# Patient Record
Sex: FEMALE | Race: WHITE | NOT HISPANIC OR LATINO | Employment: FULL TIME | URBAN - METROPOLITAN AREA
[De-identification: names, ages, dates, MRNs, and addresses within clinical notes are randomized per-mention and may not be internally consistent; named-entity substitution may affect disease eponyms.]

---

## 2017-08-12 ENCOUNTER — HOSPITAL ENCOUNTER (EMERGENCY)
Facility: HOSPITAL | Age: 22
Discharge: HOME | End: 2017-08-13
Attending: EMERGENCY MEDICINE | Admitting: EMERGENCY MEDICINE

## 2017-08-12 DIAGNOSIS — R10.11 RIGHT UPPER QUADRANT ABDOMINAL PAIN: Primary | ICD-10-CM

## 2017-08-12 LAB
BASOPHILS # BLD AUTO: 0.02 10*3/MM3 (ref 0–0.2)
BASOPHILS NFR BLD AUTO: 0.2 % (ref 0–1.5)
BILIRUB UR QL STRIP: NEGATIVE
CLARITY UR: CLEAR
COLOR UR: YELLOW
DEPRECATED RDW RBC AUTO: 40.4 FL (ref 37–54)
EOSINOPHIL # BLD AUTO: 0.15 10*3/MM3 (ref 0–0.7)
EOSINOPHIL NFR BLD AUTO: 1.7 % (ref 0.3–6.2)
ERYTHROCYTE [DISTWIDTH] IN BLOOD BY AUTOMATED COUNT: 12.5 % (ref 11.7–13)
GLUCOSE UR STRIP-MCNC: NEGATIVE MG/DL
HCG SERPL QL: NEGATIVE
HCT VFR BLD AUTO: 45 % (ref 35.6–45.5)
HGB BLD-MCNC: 15.8 G/DL (ref 11.9–15.5)
HGB UR QL STRIP.AUTO: NEGATIVE
IMM GRANULOCYTES # BLD: 0 10*3/MM3 (ref 0–0.03)
IMM GRANULOCYTES NFR BLD: 0 % (ref 0–0.5)
KETONES UR QL STRIP: NEGATIVE
LEUKOCYTE ESTERASE UR QL STRIP.AUTO: NEGATIVE
LYMPHOCYTES # BLD AUTO: 3.44 10*3/MM3 (ref 0.9–4.8)
LYMPHOCYTES NFR BLD AUTO: 39.6 % (ref 19.6–45.3)
MCH RBC QN AUTO: 31.5 PG (ref 26.9–32)
MCHC RBC AUTO-ENTMCNC: 35.1 G/DL (ref 32.4–36.3)
MCV RBC AUTO: 89.8 FL (ref 80.5–98.2)
MONOCYTES # BLD AUTO: 0.46 10*3/MM3 (ref 0.2–1.2)
MONOCYTES NFR BLD AUTO: 5.3 % (ref 5–12)
NEUTROPHILS # BLD AUTO: 4.62 10*3/MM3 (ref 1.9–8.1)
NEUTROPHILS NFR BLD AUTO: 53.2 % (ref 42.7–76)
NITRITE UR QL STRIP: NEGATIVE
PH UR STRIP.AUTO: 7.5 [PH] (ref 5–8)
PLATELET # BLD AUTO: 321 10*3/MM3 (ref 140–500)
PMV BLD AUTO: 9.8 FL (ref 6–12)
PROT UR QL STRIP: NEGATIVE
RBC # BLD AUTO: 5.01 10*6/MM3 (ref 3.9–5.2)
SP GR UR STRIP: <1.005 (ref 1–1.03)
UROBILINOGEN UR QL STRIP: ABNORMAL
WBC NRBC COR # BLD: 8.69 10*3/MM3 (ref 4.5–10.7)

## 2017-08-12 PROCEDURE — 99283 EMERGENCY DEPT VISIT LOW MDM: CPT

## 2017-08-12 PROCEDURE — 83690 ASSAY OF LIPASE: CPT | Performed by: EMERGENCY MEDICINE

## 2017-08-12 PROCEDURE — 81003 URINALYSIS AUTO W/O SCOPE: CPT

## 2017-08-12 PROCEDURE — 36415 COLL VENOUS BLD VENIPUNCTURE: CPT

## 2017-08-12 PROCEDURE — 85025 COMPLETE CBC W/AUTO DIFF WBC: CPT

## 2017-08-12 PROCEDURE — 84703 CHORIONIC GONADOTROPIN ASSAY: CPT | Performed by: EMERGENCY MEDICINE

## 2017-08-12 PROCEDURE — 80053 COMPREHEN METABOLIC PANEL: CPT | Performed by: EMERGENCY MEDICINE

## 2017-08-12 RX ORDER — SODIUM CHLORIDE 0.9 % (FLUSH) 0.9 %
10 SYRINGE (ML) INJECTION AS NEEDED
Status: DISCONTINUED | OUTPATIENT
Start: 2017-08-12 | End: 2017-08-13 | Stop reason: HOSPADM

## 2017-08-12 RX ORDER — OMEPRAZOLE 20 MG/1
20 CAPSULE, DELAYED RELEASE ORAL DAILY
COMMUNITY

## 2017-08-13 ENCOUNTER — APPOINTMENT (OUTPATIENT)
Dept: ULTRASOUND IMAGING | Facility: HOSPITAL | Age: 22
End: 2017-08-13

## 2017-08-13 VITALS
WEIGHT: 200 LBS | BODY MASS INDEX: 34.15 KG/M2 | HEART RATE: 80 BPM | HEIGHT: 64 IN | OXYGEN SATURATION: 100 % | SYSTOLIC BLOOD PRESSURE: 128 MMHG | DIASTOLIC BLOOD PRESSURE: 71 MMHG | RESPIRATION RATE: 16 BRPM | TEMPERATURE: 98.4 F

## 2017-08-13 LAB
ALBUMIN SERPL-MCNC: 5.4 G/DL (ref 3.5–5.2)
ALBUMIN/GLOB SERPL: 1.9 G/DL
ALP SERPL-CCNC: 81 U/L (ref 39–117)
ALT SERPL W P-5'-P-CCNC: 32 U/L (ref 1–33)
ANION GAP SERPL CALCULATED.3IONS-SCNC: 15.1 MMOL/L
AST SERPL-CCNC: 24 U/L (ref 1–32)
BILIRUB SERPL-MCNC: 0.3 MG/DL (ref 0.1–1.2)
BUN BLD-MCNC: 11 MG/DL (ref 6–20)
BUN/CREAT SERPL: 12.8 (ref 7–25)
CALCIUM SPEC-SCNC: 10.4 MG/DL (ref 8.6–10.5)
CHLORIDE SERPL-SCNC: 102 MMOL/L (ref 98–107)
CO2 SERPL-SCNC: 24.9 MMOL/L (ref 22–29)
CREAT BLD-MCNC: 0.86 MG/DL (ref 0.57–1)
GFR SERPL CREATININE-BSD FRML MDRD: 82 ML/MIN/1.73
GLOBULIN UR ELPH-MCNC: 2.8 GM/DL
GLUCOSE BLD-MCNC: 99 MG/DL (ref 65–99)
HOLD SPECIMEN: NORMAL
HOLD SPECIMEN: NORMAL
LIPASE SERPL-CCNC: 32 U/L (ref 13–60)
POTASSIUM BLD-SCNC: 4.1 MMOL/L (ref 3.5–5.2)
PROT SERPL-MCNC: 8.2 G/DL (ref 6–8.5)
SODIUM BLD-SCNC: 142 MMOL/L (ref 136–145)
WHOLE BLOOD HOLD SPECIMEN: NORMAL
WHOLE BLOOD HOLD SPECIMEN: NORMAL

## 2017-08-13 PROCEDURE — 76705 ECHO EXAM OF ABDOMEN: CPT

## 2017-08-13 PROCEDURE — 96374 THER/PROPH/DIAG INJ IV PUSH: CPT

## 2017-08-13 PROCEDURE — 25010000002 PROMETHAZINE PER 50 MG: Performed by: EMERGENCY MEDICINE

## 2017-08-13 PROCEDURE — 96361 HYDRATE IV INFUSION ADD-ON: CPT

## 2017-08-13 RX ORDER — PROMETHAZINE HYDROCHLORIDE 25 MG/ML
12.5 INJECTION, SOLUTION INTRAMUSCULAR; INTRAVENOUS ONCE
Status: COMPLETED | OUTPATIENT
Start: 2017-08-13 | End: 2017-08-13

## 2017-08-13 RX ORDER — PROMETHAZINE HYDROCHLORIDE 25 MG/1
25 TABLET ORAL EVERY 6 HOURS PRN
Qty: 20 TABLET | Refills: 0 | Status: SHIPPED | OUTPATIENT
Start: 2017-08-13

## 2017-08-13 RX ORDER — DICYCLOMINE HCL 20 MG
20 TABLET ORAL EVERY 6 HOURS
Qty: 40 TABLET | Refills: 0 | Status: SHIPPED | OUTPATIENT
Start: 2017-08-13 | End: 2017-11-07 | Stop reason: SDUPTHER

## 2017-08-13 RX ADMIN — SODIUM CHLORIDE 1000 ML: 9 INJECTION, SOLUTION INTRAVENOUS at 00:57

## 2017-08-13 RX ADMIN — PROMETHAZINE HYDROCHLORIDE 12.5 MG: 25 INJECTION INTRAMUSCULAR; INTRAVENOUS at 00:59

## 2017-08-13 NOTE — ED NOTES
abd pain with n/v and fever since 1300. pt states she is unable to keep down fluids.      Maya Pa RN  08/12/17 2317

## 2017-08-13 NOTE — ED PROVIDER NOTES
EMERGENCY DEPARTMENT ENCOUNTER    CHIEF COMPLAINT  Chief Complaint: RUQ abdominal pain  History given by: Pt  History limited by: N/A  Room Number: 22/22  PMD: No Known Provider      HPI:  Pt is a 22 y.o. female who presents complaining of RUQ abdominal waxing and waning pain that radiates to the back with an onset of 3 months ago. Pain is described as a dull ache and is rated 5/10. Pt's pain is exuberated by eating. Pt is also c/o of fever with Tmax of 99.8, N//V [multiple episodes] for the past 24 hours, and bowel changes that have been ongoing for the past 3 months as well. These bowel changes range from constipation to diarrhea. Pt denies any hx of similar episodes or a hx of cholelithiasis.     Duration: 3 months   Onset: gradual   Timing: waxing and waning   Location: RUQ abdomen   Radiation: to the right side of the back  Quality: dull ache   Intensity/Severity: 5/10  Progression: unchanged   Associated Symptoms: pain radiation into the back, fever, N/V, bowel changes  Aggravating Factors: Eating   Alleviating Factors: None reported   Previous Episodes: Pt has no hx of similar episodes.   Treatment before arrival: None reported.     PAST MEDICAL HISTORY  Active Ambulatory Problems     Diagnosis Date Noted   • Right shoulder pain 09/13/2016     Resolved Ambulatory Problems     Diagnosis Date Noted   • No Resolved Ambulatory Problems     Past Medical History:   Diagnosis Date   • Ankle sprain    • Fracture of ankle    • Shoulder dislocation 2011       PAST SURGICAL HISTORY  Past Surgical History:   Procedure Laterality Date   • ANKLE OPEN REDUCTION INTERNAL FIXATION Right 2010    Reconstruction surgeries 2010 & 2011       FAMILY HISTORY  Family History   Problem Relation Age of Onset   • Heart disease Maternal Grandfather    • Hypertension Maternal Grandfather    • Arthritis Paternal Grandfather        SOCIAL HISTORY  Social History     Social History   • Marital status: Single     Spouse name: N/A   • Number  of children: N/A   • Years of education: N/A     Occupational History   • Not on file.     Social History Main Topics   • Smoking status: Never Smoker   • Smokeless tobacco: Never Used   • Alcohol use Yes      Comment: Occasional   • Drug use: No   • Sexual activity: Yes     Partners: Male     Birth control/ protection: Condom     Other Topics Concern   • Not on file     Social History Narrative       ALLERGIES  Review of patient's allergies indicates no known allergies.    REVIEW OF SYSTEMS  Review of Systems   Constitutional: Positive for fever (Tmax 99.8).   HENT: Negative for sore throat.    Eyes: Negative.    Respiratory: Negative for cough and shortness of breath.    Cardiovascular: Negative for chest pain.   Gastrointestinal: Positive for abdominal pain (RUQ pain ), constipation, diarrhea, nausea and vomiting (multiple episodes for past 24 hours ).   Genitourinary: Negative for dysuria.   Musculoskeletal: Positive for back pain. Negative for neck pain.   Skin: Negative for rash.   Allergic/Immunologic: Negative.    Neurological: Negative for weakness, numbness and headaches.   Hematological: Negative.    Psychiatric/Behavioral: Negative.    All other systems reviewed and are negative.      PHYSICAL EXAM  ED Triage Vitals   Temp Heart Rate Resp BP SpO2   08/12/17 2310 08/12/17 2310 08/12/17 2310 08/12/17 2310 08/12/17 2310   98.4 °F (36.9 °C) 116 18 156/113 98 %      Temp src Heart Rate Source Patient Position BP Location FiO2 (%)   08/12/17 2310 08/12/17 2310 08/12/17 2310 08/12/17 2310 --   Oral Monitor Standing Right arm        Physical Exam   Constitutional: She is oriented to person, place, and time and well-developed, well-nourished, and in no distress. No distress.   HENT:   Head: Normocephalic and atraumatic.   Eyes: EOM are normal. Pupils are equal, round, and reactive to light.   Neck: Normal range of motion. Neck supple.   Cardiovascular: Normal rate, regular rhythm and normal heart sounds.     Pulmonary/Chest: Effort normal and breath sounds normal. No respiratory distress.   Abdominal: Soft. She exhibits no mass. There is tenderness (RUQ). There is no rebound and no guarding.   RUQ intermittent pain triggered by eating and some radiation to back intermittently  RUQ tenderness    Musculoskeletal: Normal range of motion. She exhibits no edema.   Neurological: She is alert and oriented to person, place, and time. She has normal sensation and normal strength.   Skin: Skin is warm and dry. No rash noted.   Psychiatric: Mood and affect normal.   Nursing note and vitals reviewed.      LAB RESULTS  Lab Results (last 24 hours)     Procedure Component Value Units Date/Time    CBC & Differential [[id number]] Collected:  08/12/17 2326    Specimen:  Blood Updated:  08/12/17 2342    Narrative:       The following orders were created for panel order CBC & Differential.  Procedure                               Abnormality         Status                     ---------                               -----------         ------                     CBC Auto Differential[[id number]]        Abnormal            Final result                 Please view results for these tests on the individual orders.    Comprehensive Metabolic Panel [[id number]]  (Abnormal) Collected:  08/12/17 2326    Specimen:  Blood Updated:  08/13/17 0002     Glucose 99 mg/dL      BUN 11 mg/dL      Creatinine 0.86 mg/dL      Sodium 142 mmol/L      Potassium 4.1 mmol/L      Chloride 102 mmol/L      CO2 24.9 mmol/L      Calcium 10.4 mg/dL      Total Protein 8.2 g/dL      Albumin 5.40 (H) g/dL      ALT (SGPT) 32 U/L      AST (SGOT) 24 U/L      Alkaline Phosphatase 81 U/L      Total Bilirubin 0.3 mg/dL      eGFR Non African Amer 82 mL/min/1.73      Globulin 2.8 gm/dL      A/G Ratio 1.9 g/dL      BUN/Creatinine Ratio 12.8     Anion Gap 15.1 mmol/L     Lipase [[id number]]  (Normal) Collected:  08/12/17 2326    Specimen:  Blood Updated:  08/13/17 0002     Lipase 32  U/L     hCG, Serum, Qualitative [[id number]]  (Normal) Collected:  08/12/17 2326    Specimen:  Blood Updated:  08/12/17 2358     HCG Qualitative Negative    CBC Auto Differential [[id number]]  (Abnormal) Collected:  08/12/17 2326    Specimen:  Blood Updated:  08/12/17 2342     WBC 8.69 10*3/mm3      RBC 5.01 10*6/mm3      Hemoglobin 15.8 (H) g/dL      Hematocrit 45.0 %      MCV 89.8 fL      MCH 31.5 pg      MCHC 35.1 g/dL      RDW 12.5 %      RDW-SD 40.4 fl      MPV 9.8 fL      Platelets 321 10*3/mm3      Neutrophil % 53.2 %      Lymphocyte % 39.6 %      Monocyte % 5.3 %      Eosinophil % 1.7 %      Basophil % 0.2 %      Immature Grans % 0.0 %      Neutrophils, Absolute 4.62 10*3/mm3      Lymphocytes, Absolute 3.44 10*3/mm3      Monocytes, Absolute 0.46 10*3/mm3      Eosinophils, Absolute 0.15 10*3/mm3      Basophils, Absolute 0.02 10*3/mm3      Immature Grans, Absolute 0.00 10*3/mm3     Urinalysis With / Culture If Indicated [[id number]]  (Abnormal) Collected:  08/12/17 2331    Specimen:  Urine from Urine, Clean Catch Updated:  08/12/17 2344     Color, UA Yellow     Appearance, UA Clear     pH, UA 7.5     Specific Gravity, UA <1.005 (L)     Glucose, UA Negative     Ketones, UA Negative     Bilirubin, UA Negative     Blood, UA Negative     Protein, UA Negative     Leuk Esterase, UA Negative     Nitrite, UA Negative     Urobilinogen, UA 0.2 E.U./dL    Narrative:       Urine microscopic not indicated.          I ordered the above labs and reviewed the results    RADIOLOGY  US Gallbladder   Final Result   1. Mild diffuse fatty infiltration of the liver.   2. Unremarkable gallbladder.           This report was finalized on 8/13/2017 1:03 AM by Zuhair Hernadez MD.               I ordered the above noted radiological studies. Interpreted by radiologist. Reviewed by me in PACS.       PROCEDURES  Procedures      PROGRESS AND CONSULTS  ED Course     2313: Ordered labs for further evaluation and analysis.     0029: Ordered US  Gallbladder for further evaluation and analysis.     0118: Rechecked pt and discussed negative US Gallbladders. Gave pt a GI specialist to f/u with according to pt's discretion. Prescribed Phenergan for nausea and Bentyl to be taken 30 minutes prior to eating. Discussed plans to d/c pt. Pt understands and agrees with plan. All Questions addressed at time.   MEDICAL DECISION MAKING  Results were reviewed/discussed with the patient and they were also made aware of online access. Pt also made aware that some labs, such as cultures, will not be resulted during ER visit and follow up with PMD is necessary.     MDM  Number of Diagnoses or Management Options     Amount and/or Complexity of Data Reviewed  Clinical lab tests: ordered and reviewed (Lipase 32)  Tests in the radiology section of CPT®: ordered and reviewed (US Gallbladder: Negative )           DIAGNOSIS  Final diagnoses:   Right upper quadrant abdominal pain       DISPOSITION  DISCHARGE    Patient discharged in stable condition.    Reviewed implications of results, diagnosis, meds, responsibility to follow up, warning signs and symptoms of possible worsening, potential complications and reasons to return to ER.    Patient/Family voiced understanding of above instructions.    Discussed plan for discharge, as there is no emergent indication for admission.  Pt/family is agreeable and understands need for follow up and repeat testing.  Pt is aware that discharge does not mean that nothing is wrong but it indicates no emergency is present that requires admission and they must continue care with follow-up as given below or physician of their choice.     FOLLOW-UP  [facility]  [facility] [address] [phone]  Call      Jesús Swartz MD  [address] [address] [phone]    Call           Medication List      New Prescriptions          dicyclomine 20 MG tablet   Commonly known as:  BENTYL    Take 1 tablet by mouth Every 6 (Six) Hours.       promethazine 25 MG tablet   Commonly known as:  PHENERGAN   Take 1 tablet by mouth Every 6 (Six) Hours As Needed for Nausea or   Vomiting.               Latest Documented Vital Signs:  As of 1:10 AM  BP- (!) 156/113 HR- 116 Temp- 98.4 °F (36.9 °C) (Oral) O2 sat- 100%    --  Documentation assistance provided by saida Contreras for Dr. Frances.  Information recorded by the scribe was done at my direction and has been verified and validated by me.     Piero Contreras  08/13/17 0120       Jose Frances MD  08/13/17 0601

## 2017-11-06 ENCOUNTER — TELEPHONE (OUTPATIENT)
Dept: GASTROENTEROLOGY | Facility: CLINIC | Age: 22
End: 2017-11-06

## 2017-11-06 NOTE — TELEPHONE ENCOUNTER
Call to mother, Hanna (see HIPAA auth of 9/13/16).  Reports that pt seen in ER 8/12/17 (see encounter note).  Since then, has continued to experience abd cramping, diarrhea.  Has lost 25 lbs in past 3 mo's.  Friday and Saturday had 4-7 liquid brown diarrhea stools/day.  No blood or mucous.  No fever.      Pt works at [employer] - sent home last night because of yellow, watery, foul smelling diarrhea.  Reports that crying in pain - localized to R side - above navel, and L side - below navel.  Has had negative US, HIDA, H pylori testing.  Mother concerned how to manage symptoms until appt on 11/17.  Advise if symptoms worsen, go to ER.  Hanna verb understanding.    Confer with Manager.  Rescheduled for 11/7 @ 4313.    Update to DR Pacheco.

## 2017-11-06 NOTE — TELEPHONE ENCOUNTER
----- Message from Joaquin Kirk sent at 11/6/2017  8:00 AM EST -----  Regarding: PT MOTHER ROWAN CALLED  Contact: [phone]   PT IS CALLING. SHE IS GOING TO BE A NEW PT & HER APPT IS NOV 17TH &  PT MOTHER IS CALLING STATING SHE IS HAVING CHANGES IN BOWELS, 4-7 DIARRHEA STOOLS OVER THE WEEKEND, HAD A ER VISIT ABOUT 2 MONTHS AGO. ALL TEST CAME BACK NEG, PT LOST 25 POUNDS IN THE LAST 3 MONTHS. MOTHER IS ASKING IS THEIR SOMETHING SHE CAN DO UP UNTIL HER APPT.   WORK:[phone]

## 2017-11-07 ENCOUNTER — OFFICE VISIT (OUTPATIENT)
Dept: GASTROENTEROLOGY | Facility: CLINIC | Age: 22
End: 2017-11-07

## 2017-11-07 VITALS
DIASTOLIC BLOOD PRESSURE: 86 MMHG | BODY MASS INDEX: 34.83 KG/M2 | WEIGHT: 204 LBS | SYSTOLIC BLOOD PRESSURE: 128 MMHG | HEIGHT: 64 IN | TEMPERATURE: 98.2 F

## 2017-11-07 DIAGNOSIS — R10.11 RIGHT UPPER QUADRANT ABDOMINAL PAIN: Primary | ICD-10-CM

## 2017-11-07 DIAGNOSIS — R11.14 BILIOUS VOMITING WITH NAUSEA: ICD-10-CM

## 2017-11-07 DIAGNOSIS — K59.1 FUNCTIONAL DIARRHEA: ICD-10-CM

## 2017-11-07 PROCEDURE — 99204 OFFICE O/P NEW MOD 45 MIN: CPT | Performed by: INTERNAL MEDICINE

## 2017-11-07 RX ORDER — DICYCLOMINE HCL 20 MG
20 TABLET ORAL EVERY 6 HOURS PRN
Qty: 90 TABLET | Refills: 2 | Status: SHIPPED | OUTPATIENT
Start: 2017-11-07 | End: 2017-12-07

## 2017-11-07 NOTE — PATIENT INSTRUCTIONS
Labs today    Schedule the CT scan    Use the dicyclomine as needed for diarrhea, cramps    Start a daily probiotic-- align, culturelle, pradhan [place] [facility]    Start daily fiber supplementation with citrucel tabs (or generic)-- 2 tabs with breakfast and dinner with 8oz water    For any additional questions, concerns or changes to your condition after today's office visit please contact the office at [phone].

## 2017-11-07 NOTE — PROGRESS NOTES
Chief Complaint   Patient presents with   • Nausea   • Constipation   • Abdominal Pain       Subjective     HPI    Zulma Rosenthal is a 22 y.o. female with a past medical history noted below who presents for evaluation of R sided abdominal pain, nausea, vomiting, and alternating diarrhea and constipation.  Symptoms started about 7 months ago.  She denies any precipitants though she is frequently around sick people.  RUQ pain is constant.  It is worse with eating.  Sharp like someone is poking her hard in this region.  Stomach burns with food when she eats.  She gets nausea and vomiting episodes about every 2 weeks.  Will last 1-2 days and then resolve.  Associated with feeling ill.  This comes along with the diarrhea.  This weekend she had bad episode-- profuse and urgent yellow colored stools.  She was having 5-6 urgent BMs per day, some accidents.  No blood in her stools.  This includes stools that she wakes from sleep to have.  This will settle down on its own without any medications and then she will have some constipation.  She has 1 BM every other day to every 3rd day.  The stools are soft but she does have cramping.  She considers this to be her baseline.    She is on prilosec which has helped her heartburn. She was given dicyclomine from the ER in August but didn't find too much relief with it but was told to stop this by her PCP.    Workup has included a negative HIDA scan per her reports (done a ), negative H pylori testing with her PCP, and a negative RUQ US here in the ED in August (reviewed).  Her labs at that time had no significant abnormalities.    Reports losing 25# in the past 3 months though weight appears stable to August.     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  No IBD in her family.    No smoking, rare alcohol.        Past Medical History:   Diagnosis Date   • Ankle  sprain    • Fracture of ankle    • Shoulder dislocation 2011         Current Outpatient Prescriptions:   •  NON FORMULARY, EHT, Disp: , Rfl:   •  omeprazole (priLOSEC) 20 MG capsule, Take 20 mg by mouth Daily., Disp: , Rfl:   •  promethazine (PHENERGAN) 25 MG tablet, Take 1 tablet by mouth Every 6 (Six) Hours As Needed for Nausea or Vomiting., Disp: 20 tablet, Rfl: 0  •  acetaminophen (TYLENOL) 500 MG tablet, Take 500 mg by mouth every 6 (six) hours as needed for mild pain (1-3)., Disp: , Rfl:   •  dicyclomine (BENTYL) 20 MG tablet, Take 1 tablet by mouth Every 6 (Six) Hours As Needed (diarrhea, cramping) for up to 30 days., Disp: 90 tablet, Rfl: 2    No Known Allergies    Social History     Social History   • Marital status: Single     Spouse name: N/A   • Number of children: N/A   • Years of education: N/A     Occupational History   • Not on file.     Social History Main Topics   • Smoking status: Never Smoker   • Smokeless tobacco: Never Used   • Alcohol use Yes      Comment: Occasional   • Drug use: No   • Sexual activity: Yes     Partners: Male     Birth control/ protection: Condom     Other Topics Concern   • Not on file     Social History Narrative       Family History   Problem Relation Age of Onset   • Heart disease Maternal Grandfather    • Hypertension Maternal Grandfather    • Arthritis Paternal Grandfather        Review of Systems   Constitutional: Negative for activity change, appetite change and fatigue.   HENT: Negative for sore throat and trouble swallowing.    Respiratory: Negative.    Cardiovascular: Negative.    Gastrointestinal: Positive for abdominal pain, constipation, diarrhea, nausea and vomiting. Negative for abdominal distention and blood in stool.   Endocrine: Negative for cold intolerance and heat intolerance.   Genitourinary: Negative for difficulty urinating, dysuria and frequency.   Musculoskeletal: Negative for arthralgias, back pain and myalgias.   Skin: Negative.    Hematological:  Negative for adenopathy. Does not bruise/bleed easily.   All other systems reviewed and are negative.      Objective     Vitals:    11/07/17 1540   BP: 128/86   Temp: 98.2 °F (36.8 °C)     Last 2 weights    11/07/17  1540   Weight: 204 lb (92.5 kg)     Body mass index is 35.02 kg/(m^2).    Physical Exam   Constitutional: She is oriented to person, place, and time. She appears well-developed and well-nourished. No distress.   HENT:   Head: Normocephalic and atraumatic.   Right Ear: External ear normal.   Left Ear: External ear normal.   Nose: Nose normal.   Mouth/Throat: Oropharynx is clear and moist.   Eyes: Conjunctivae and EOM are normal. Right eye exhibits no discharge. Left eye exhibits no discharge. No scleral icterus.   Neck: Normal range of motion. Neck supple. No thyromegaly present.   No supraclavicular adenopathy   Cardiovascular: Normal rate, regular rhythm, normal heart sounds and intact distal pulses.  Exam reveals no gallop.    No murmur heard.  No lower extremity edema   Pulmonary/Chest: Effort normal and breath sounds normal. No respiratory distress. She has no wheezes.   Abdominal: Soft. Normal appearance and bowel sounds are normal. She exhibits no distension and no mass. There is no hepatosplenomegaly. There is tenderness. There is no rigidity, no rebound and no guarding. No hernia.   Obese, TTP RUQ and just to right of mid abdomen   Genitourinary:   Genitourinary Comments: Rectal exam deferred   Musculoskeletal: Normal range of motion. She exhibits no edema or tenderness.   No atrophy of upper or lower extremities.  Normal digits and nails of both hands.   Lymphadenopathy:     She has no cervical adenopathy.   Neurological: She is alert and oriented to person, place, and time. She displays no atrophy. Coordination normal.   Skin: Skin is warm and dry. No rash noted. She is not diaphoretic. No erythema.   Psychiatric: She has a normal mood and affect. Her behavior is normal. Judgment and thought  content normal.   Vitals reviewed.      WBC   Date Value Ref Range Status   08/12/2017 8.69 4.50 - 10.70 10*3/mm3 Final     RBC   Date Value Ref Range Status   08/12/2017 5.01 3.90 - 5.20 10*6/mm3 Final     Hemoglobin   Date Value Ref Range Status   08/12/2017 15.8 (H) 11.9 - 15.5 g/dL Final     Hematocrit   Date Value Ref Range Status   08/12/2017 45.0 35.6 - 45.5 % Final     MCV   Date Value Ref Range Status   08/12/2017 89.8 80.5 - 98.2 fL Final     MCH   Date Value Ref Range Status   08/12/2017 31.5 26.9 - 32.0 pg Final     MCHC   Date Value Ref Range Status   08/12/2017 35.1 32.4 - 36.3 g/dL Final     RDW   Date Value Ref Range Status   08/12/2017 12.5 11.7 - 13.0 % Final     RDW-SD   Date Value Ref Range Status   08/12/2017 40.4 37.0 - 54.0 fl Final     MPV   Date Value Ref Range Status   08/12/2017 9.8 6.0 - 12.0 fL Final     Platelets   Date Value Ref Range Status   08/12/2017 321 140 - 500 10*3/mm3 Final     Neutrophil %   Date Value Ref Range Status   08/12/2017 53.2 42.7 - 76.0 % Final     Lymphocyte %   Date Value Ref Range Status   08/12/2017 39.6 19.6 - 45.3 % Final     Monocyte %   Date Value Ref Range Status   08/12/2017 5.3 5.0 - 12.0 % Final     Eosinophil %   Date Value Ref Range Status   08/12/2017 1.7 0.3 - 6.2 % Final     Basophil %   Date Value Ref Range Status   08/12/2017 0.2 0.0 - 1.5 % Final     Immature Grans %   Date Value Ref Range Status   08/12/2017 0.0 0.0 - 0.5 % Final     Neutrophils, Absolute   Date Value Ref Range Status   08/12/2017 4.62 1.90 - 8.10 10*3/mm3 Final     Lymphocytes, Absolute   Date Value Ref Range Status   08/12/2017 3.44 0.90 - 4.80 10*3/mm3 Final     Monocytes, Absolute   Date Value Ref Range Status   08/12/2017 0.46 0.20 - 1.20 10*3/mm3 Final     Eosinophils, Absolute   Date Value Ref Range Status   08/12/2017 0.15 0.00 - 0.70 10*3/mm3 Final     Basophils, Absolute   Date Value Ref Range Status   08/12/2017 0.02 0.00 - 0.20 10*3/mm3 Final     Immature Grans,  Absolute   Date Value Ref Range Status   08/12/2017 0.00 0.00 - 0.03 10*3/mm3 Final       Glucose   Date Value Ref Range Status   08/12/2017 99 65 - 99 mg/dL Final     Sodium   Date Value Ref Range Status   08/12/2017 142 136 - 145 mmol/L Final     Potassium   Date Value Ref Range Status   08/12/2017 4.1 3.5 - 5.2 mmol/L Final     CO2   Date Value Ref Range Status   08/12/2017 24.9 22.0 - 29.0 mmol/L Final     Chloride   Date Value Ref Range Status   08/12/2017 102 98 - 107 mmol/L Final     Anion Gap   Date Value Ref Range Status   08/12/2017 15.1 mmol/L Final     Creatinine   Date Value Ref Range Status   08/12/2017 0.86 0.57 - 1.00 mg/dL Final     BUN   Date Value Ref Range Status   08/12/2017 11 6 - 20 mg/dL Final     BUN/Creatinine Ratio   Date Value Ref Range Status   08/12/2017 12.8 7.0 - 25.0 Final     Calcium   Date Value Ref Range Status   08/12/2017 10.4 8.6 - 10.5 mg/dL Final     eGFR Non  Amer   Date Value Ref Range Status   08/12/2017 82 >60 mL/min/1.73 Final     Alkaline Phosphatase   Date Value Ref Range Status   08/12/2017 81 39 - 117 U/L Final     Total Protein   Date Value Ref Range Status   08/12/2017 8.2 6.0 - 8.5 g/dL Final     ALT (SGPT)   Date Value Ref Range Status   08/12/2017 32 1 - 33 U/L Final     AST (SGOT)   Date Value Ref Range Status   08/12/2017 24 1 - 32 U/L Final     Total Bilirubin   Date Value Ref Range Status   08/12/2017 0.3 0.1 - 1.2 mg/dL Final     Albumin   Date Value Ref Range Status   08/12/2017 5.40 (H) 3.50 - 5.20 g/dL Final     Globulin   Date Value Ref Range Status   08/12/2017 2.8 gm/dL Final     A/G Ratio   Date Value Ref Range Status   08/12/2017 1.9 g/dL Final         Imaging Results (last 7 days)     ** No results found for the last 168 hours. **            No notes on file    Assessment/Plan    1. RUQ pain: persistent and worse following meals.  Normal RUQ US, reports a normal HIDA.  ? Fatty liver, ulcer, functional pain  2. Nausea, vomiting, and diarrhea:  intermittent but severe episodes, occurring every few weeks without precipitant.  ? IBS vs IBD vs gastric/GI motility issue  3. Constipation: her baseline in between episodes, this does support an IBS picture    Plan  -Rule out celiac disease today, check inflammatory markers  -Given the abdomen and pelvis for persistent abdominal pain and symptoms.  I cautioned for her to limit any further CT scans in the future  -I will get prior records from Dr. Barrera's office  -I want her back on the Bentyl into uses as needed for diarrhea and cramping  -I want her to start a daily probiotic  -I want her on citrucel fiber supplementation  -We discussed that if there are any abnormalities in her lab work or her CT scan or if her symptoms persist, then endoscopy as the next step  -Advised otherwise healthy diet      Zulma was seen today for nausea, constipation and abdominal pain.    Diagnoses and all orders for this visit:    Right upper quadrant abdominal pain  -     Sedimentation Rate  -     C-reactive Protein  -     IgA  -     Tissue Transglutaminase, IgA  -     Tissue Transglutaminase, IgG  -     CT Abdomen Pelvis With Contrast; Future    Functional diarrhea  -     dicyclomine (BENTYL) 20 MG tablet; Take 1 tablet by mouth Every 6 (Six) Hours As Needed (diarrhea, cramping) for up to 30 days.  -     Sedimentation Rate  -     C-reactive Protein  -     IgA  -     Tissue Transglutaminase, IgA  -     Tissue Transglutaminase, IgG  -     CT Abdomen Pelvis With Contrast; Future    Bilious vomiting with nausea  -     Sedimentation Rate  -     C-reactive Protein  -     IgA  -     Tissue Transglutaminase, IgA  -     Tissue Transglutaminase, IgG  -     CT Abdomen Pelvis With Contrast; Future        I have discussed the above plan with the patient.  They verbalize understanding and are in agreement with the plan.  They have been advised to contact the office for any questions, concerns, or changes related to their health.    Dictated  utilizing Dragon dictation

## 2017-11-08 LAB
CRP SERPL-MCNC: 0.71 MG/DL (ref 0–0.5)
ERYTHROCYTE [SEDIMENTATION RATE] IN BLOOD BY WESTERGREN METHOD: 11 MM/HR (ref 0–20)
IGA SERPL-MCNC: 159 MG/DL (ref 87–352)
TTG IGA SER-ACNC: <2 U/ML (ref 0–3)
TTG IGG SER-ACNC: <2 U/ML (ref 0–5)

## 2017-11-09 ENCOUNTER — TELEPHONE (OUTPATIENT)
Dept: GASTROENTEROLOGY | Facility: CLINIC | Age: 22
End: 2017-11-09

## 2017-11-09 NOTE — TELEPHONE ENCOUNTER
Call returned to pt.  Advise per Dr Pacheco that celiac tests were negative.  One of inflammatory markers was elevated, the other was not.    Will await the results from CT.    Pt verb understanding.  [facility] has not yet heard re: CT schedule.  Advise to contact [facility] @ [phone].  [facility] will do so.

## 2017-11-09 NOTE — TELEPHONE ENCOUNTER
----- Message from Zee Pacheco MD sent at 11/8/2017  8:51 PM EST -----  Her celiac tests were negative.  One of her inflammatory markers was elevated, the other was not.    Will await the results from her CT scan.

## 2017-11-09 NOTE — PROGRESS NOTES
Her celiac tests were negative.  One of her inflammatory markers was elevated, the other was not.    Will await the results from her CT scan.

## 2017-11-17 ENCOUNTER — HOSPITAL ENCOUNTER (OUTPATIENT)
Dept: CT IMAGING | Facility: HOSPITAL | Age: 22
Discharge: HOME | End: 2017-11-17
Attending: INTERNAL MEDICINE | Admitting: INTERNAL MEDICINE

## 2017-11-17 DIAGNOSIS — R10.11 RIGHT UPPER QUADRANT ABDOMINAL PAIN: ICD-10-CM

## 2017-11-17 DIAGNOSIS — K59.1 FUNCTIONAL DIARRHEA: ICD-10-CM

## 2017-11-17 DIAGNOSIS — R11.14 BILIOUS VOMITING WITH NAUSEA: ICD-10-CM

## 2017-11-17 PROCEDURE — 74177 CT ABD & PELVIS W/CONTRAST: CPT

## 2017-11-17 PROCEDURE — 0 IOPAMIDOL 61 % SOLUTION: Performed by: INTERNAL MEDICINE

## 2017-11-17 RX ADMIN — IOPAMIDOL 85 ML: 612 INJECTION, SOLUTION INTRAVENOUS at 08:54

## 2017-11-21 ENCOUNTER — TELEPHONE (OUTPATIENT)
Dept: GASTROENTEROLOGY | Facility: CLINIC | Age: 22
End: 2017-11-21

## 2017-11-21 NOTE — TELEPHONE ENCOUNTER
----- Message from Sarah Reagan sent at 11/21/2017 11:35 AM EST -----  Regarding: PT CALLED - CT SCAN  Contact: [phone]  RESULTS

## 2017-11-22 ENCOUNTER — TELEPHONE (OUTPATIENT)
Dept: GASTROENTEROLOGY | Facility: CLINIC | Age: 22
End: 2017-11-22

## 2017-11-22 NOTE — TELEPHONE ENCOUNTER
----- Message from Zee Pacheco MD sent at 11/21/2017 12:53 PM EST -----  CT shows mild fatty liver, otherwise normal.    Will touch base at her visit in December

## 2017-11-22 NOTE — TELEPHONE ENCOUNTER
Call from pt.  Advise per DR Pacheco that CT shows mild fatty liver, otherwise normal.  Pt verb undersanding.

## 2017-11-22 NOTE — TELEPHONE ENCOUNTER
Call from pt  Advise per DR Pacheco that celiac tests were negative. One of inflammatory markers was elevated, the other was note.  Pt verb understanding.

## 2017-12-28 ENCOUNTER — OFFICE VISIT (OUTPATIENT)
Dept: GASTROENTEROLOGY | Facility: CLINIC | Age: 22
End: 2017-12-28

## 2017-12-28 VITALS
DIASTOLIC BLOOD PRESSURE: 78 MMHG | HEIGHT: 64 IN | BODY MASS INDEX: 35.44 KG/M2 | SYSTOLIC BLOOD PRESSURE: 132 MMHG | TEMPERATURE: 98.1 F | WEIGHT: 207.6 LBS

## 2017-12-28 DIAGNOSIS — R19.8 ALTERNATING CONSTIPATION AND DIARRHEA: ICD-10-CM

## 2017-12-28 DIAGNOSIS — R10.84 GENERALIZED ABDOMINAL PAIN: Primary | ICD-10-CM

## 2017-12-28 DIAGNOSIS — K76.0 FATTY LIVER: ICD-10-CM

## 2017-12-28 PROCEDURE — 99214 OFFICE O/P EST MOD 30 MIN: CPT | Performed by: INTERNAL MEDICINE

## 2017-12-28 NOTE — PATIENT INSTRUCTIONS
Increase the fiber to twice a day    Continue the probiotic    Start simethicone with every meal    Trial of IB guard    Goal of 10# weight loss over the next 6 months    Start vitamin E-- 800IU daily    For any additional questions, concerns or changes to your condition after today's office visit please contact the office at [phone].

## 2017-12-28 NOTE — PROGRESS NOTES
"Chief Complaint   Patient presents with   • Abdominal Pain   • Constipation   • Morning Sickness     at times     Subjective     HPI  Zulma Rosenthal is a 22 y.o. female who presents for follow-up of abdominal pain, alternating diarrhea and constipation and nausea and vomiting.  Workup has included normal right upper quadrant ultrasounds and HIDA scans, a CT demonstrating mild fatty liver, negative celiac testing.  Her C-reactive protein was mildly elevated.  I had her start Bentyl (which she had been on previously), probiotic, and fiber supplementation for her symptoms.    She has not taken the dicyclomine.  She has been on the probiotic and fiber.  This settled her diarrhea and now she has been stooling every other day.  However she feels that this is more constipated for her.  She is feeling uncomfortable and bloated in between the episodes.  She is not having pain with the bowel movements nor struggling to pass the stool.    Pain RUQ and LLQ but also \"everywhere\"-- burning, sharp and more noticeable when she is active.  We discussed the normal findings on her  CT, additionally she has had a normal right upper quadrant ultrasound and HIDA scan.  We discussed that this is likely a functional component, likely an irritable bowel syndrome.    We discussed the findings of fatty liver on both her ultrasound and CT scan.  We discussed that this is most likely to her weight being over 200 pounds (her BMI is 35).  We discussed the potential process of fatty liver progressing to inflammation and cirrhosis.    Her weight has increased about 7 pounds since August.  She continues to work nights at [employer] as well as going to school.  She finds that this schedule worsens her eating habits.    Past Medical History:   Diagnosis Date   • Ankle sprain    • Fracture of ankle    • Shoulder dislocation 2011       Social History     Social History   • Marital status: Single     Spouse name: N/A   • Number of children: N/A   • " Years of education: N/A     Social History Main Topics   • Smoking status: Never Smoker   • Smokeless tobacco: Never Used   • Alcohol use Yes      Comment: Occasional   • Drug use: No   • Sexual activity: Yes     Partners: Male     Birth control/ protection: Condom     Other Topics Concern   • None     Social History Narrative         Current Outpatient Prescriptions:   •  acetaminophen (TYLENOL) 500 MG tablet, Take 500 mg by mouth every 6 (six) hours as needed for mild pain (1-3)., Disp: , Rfl:   •  Levonorgestrel (MIRENA, 52 MG, IU), by Intrauterine route., Disp: , Rfl:   •  Methylcellulose, Laxative, (FIBER THERAPY PO), Take  by mouth., Disp: , Rfl:   •  NON FORMULARY, EHT, Disp: , Rfl:   •  omeprazole (priLOSEC) 20 MG capsule, Take 20 mg by mouth Daily., Disp: , Rfl:   •  Probiotic Product (PROBIOTIC COLON SUPPORT) capsule, , Disp: , Rfl:   •  promethazine (PHENERGAN) 25 MG tablet, Take 1 tablet by mouth Every 6 (Six) Hours As Needed for Nausea or Vomiting., Disp: 20 tablet, Rfl: 0    Review of Systems   Constitutional: Negative for activity change, appetite change, fatigue and fever.   HENT: Negative for sore throat and trouble swallowing.    Respiratory: Negative.    Cardiovascular: Negative.    Gastrointestinal: Positive for abdominal pain and constipation. Negative for abdominal distention, blood in stool, diarrhea, nausea and vomiting.   Endocrine: Negative for cold intolerance and heat intolerance.   Genitourinary: Negative for difficulty urinating, dysuria, frequency and hematuria.   Musculoskeletal: Negative for arthralgias, back pain and myalgias.   Skin: Negative.    Neurological: Negative for headaches.   Hematological: Negative for adenopathy. Does not bruise/bleed easily.   All other systems reviewed and are negative.      Objective   Vitals:    12/28/17 0851   BP: 132/78   Temp: 98.1 °F (36.7 °C)     Last Weight    12/28/17  0851   Weight: 94.2 kg (207 lb 9.6 oz)     Body mass index is 35.63  kg/(m^2).      Physical Exam   Constitutional: She is oriented to person, place, and time. She appears well-developed and well-nourished. No distress.   HENT:   Head: Normocephalic and atraumatic.   Right Ear: External ear normal.   Left Ear: External ear normal.   Nose: Nose normal.   Eyes: Conjunctivae and EOM are normal. No scleral icterus.   Cardiovascular: Normal rate, regular rhythm, normal heart sounds and intact distal pulses.  Exam reveals no gallop.    No murmur heard.  No lower extremity edema   Pulmonary/Chest: Effort normal and breath sounds normal. No respiratory distress. She has no wheezes.   Abdominal: Soft. Normal appearance and bowel sounds are normal. She exhibits no distension and no mass. There is no hepatosplenomegaly. There is tenderness. There is no rigidity, no rebound and no guarding.   RLq tenderness   Genitourinary:   Genitourinary Comments: Rectal exam deferred   Musculoskeletal: Normal range of motion. She exhibits no edema or tenderness.   Normal digits and nails of both hands.  No atrophy or wasting of upper or lower extremeties   Neurological: She is alert and oriented to person, place, and time. She displays no atrophy. Coordination normal.   Skin: Skin is warm and dry. No rash noted. She is not diaphoretic. No erythema.   Psychiatric: She has a normal mood and affect. Her behavior is normal. Judgment and thought content normal.   Vitals reviewed.      WBC   Date Value Ref Range Status   08/12/2017 8.69 4.50 - 10.70 10*3/mm3 Final     RBC   Date Value Ref Range Status   08/12/2017 5.01 3.90 - 5.20 10*6/mm3 Final     Hemoglobin   Date Value Ref Range Status   08/12/2017 15.8 (H) 11.9 - 15.5 g/dL Final     Hematocrit   Date Value Ref Range Status   08/12/2017 45.0 35.6 - 45.5 % Final     MCV   Date Value Ref Range Status   08/12/2017 89.8 80.5 - 98.2 fL Final     MCH   Date Value Ref Range Status   08/12/2017 31.5 26.9 - 32.0 pg Final     MCHC   Date Value Ref Range Status    08/12/2017 35.1 32.4 - 36.3 g/dL Final     RDW   Date Value Ref Range Status   08/12/2017 12.5 11.7 - 13.0 % Final     RDW-SD   Date Value Ref Range Status   08/12/2017 40.4 37.0 - 54.0 fl Final     MPV   Date Value Ref Range Status   08/12/2017 9.8 6.0 - 12.0 fL Final     Platelets   Date Value Ref Range Status   08/12/2017 321 140 - 500 10*3/mm3 Final     Neutrophil %   Date Value Ref Range Status   08/12/2017 53.2 42.7 - 76.0 % Final     Lymphocyte %   Date Value Ref Range Status   08/12/2017 39.6 19.6 - 45.3 % Final     Monocyte %   Date Value Ref Range Status   08/12/2017 5.3 5.0 - 12.0 % Final     Eosinophil %   Date Value Ref Range Status   08/12/2017 1.7 0.3 - 6.2 % Final     Basophil %   Date Value Ref Range Status   08/12/2017 0.2 0.0 - 1.5 % Final     Immature Grans %   Date Value Ref Range Status   08/12/2017 0.0 0.0 - 0.5 % Final     Neutrophils, Absolute   Date Value Ref Range Status   08/12/2017 4.62 1.90 - 8.10 10*3/mm3 Final     Lymphocytes, Absolute   Date Value Ref Range Status   08/12/2017 3.44 0.90 - 4.80 10*3/mm3 Final     Monocytes, Absolute   Date Value Ref Range Status   08/12/2017 0.46 0.20 - 1.20 10*3/mm3 Final     Eosinophils, Absolute   Date Value Ref Range Status   08/12/2017 0.15 0.00 - 0.70 10*3/mm3 Final     Basophils, Absolute   Date Value Ref Range Status   08/12/2017 0.02 0.00 - 0.20 10*3/mm3 Final     Immature Grans, Absolute   Date Value Ref Range Status   08/12/2017 0.00 0.00 - 0.03 10*3/mm3 Final       Lab Results   Component Value Date    GLUCOSE 99 08/12/2017    BUN 11 08/12/2017    CREATININE 0.86 08/12/2017    EGFRIFNONA 82 08/12/2017    BCR 12.8 08/12/2017    CO2 24.9 08/12/2017    CALCIUM 10.4 08/12/2017    ALBUMIN 5.40 (H) 08/12/2017    LABIL2 1.9 08/12/2017    AST 24 08/12/2017    ALT 32 08/12/2017         Imaging Results (last 7 days)     ** No results found for the last 168 hours. **            Assessment/Plan    Generalized abdominal pain: She has had normal  imaging except for fatty liver.  I suspect she has a component of IBS    Fatty liver: Her transaminases, specifically ALT, are pushing upper limits of normal    Alternating constipation and diarrhea: Now tending to be more constipated.  She is only taking fiber once per day.  Suspect IBS    Plan  Increase the fiber to twice a day  Continue the probiotic  Start simethicone with every meal  Trial of IB guard-- BID-- samples given  Goal of 10# weight loss over the next 6 months  Start vitamin E-- 800IU daily    Zulma was seen today for abdominal pain, constipation and morning sickness.    Diagnoses and all orders for this visit:    Generalized abdominal pain    Fatty liver    Alternating constipation and diarrhea      Dictated utilizing Dragon dictation

## 2018-01-18 ENCOUNTER — TELEPHONE (OUTPATIENT)
Dept: GASTROENTEROLOGY | Facility: CLINIC | Age: 23
End: 2018-01-18

## 2018-01-18 NOTE — TELEPHONE ENCOUNTER
Call to pt.  Advise per DR Pacheco that stomach flu going around like crazy - suspect this is what pt has.  If unable to keep any fluids down and doesn't settle down over the next few hours, then definitely to ER for further eval.    Needs to try frequent sips of water or Gatorade to maintain hydration.  Supportive care with plenty of rest.  Pt verb understanding.

## 2018-01-18 NOTE — TELEPHONE ENCOUNTER
----- Message from Joaquin Kirk sent at 1/18/2018  9:08 AM EST -----  Regarding: pt called   Contact: [phone]  Pt is calling stating she is vomiting & can not keep anything down. She has tried zofran for the past 12 hours & nothing is helping, she would like to speak with a nurse about what she can do next ?

## 2018-01-18 NOTE — TELEPHONE ENCOUNTER
The stomach flu is going around like crazy, I suspect this is what she has.  If she is unable to keep any fluids down and it doesn't settle down over the next few hours, then definitely to the emergency room for further evaluation.    Needs to try frequent sips of water or Gatorade to maintain hydration.  Supportive care with plenty of rest.

## 2018-01-18 NOTE — TELEPHONE ENCOUNTER
"Call to pt.  Reports that for past 10 hours has had severe N&V, and diarrhea.  States feels like stomach \"on fire\".  Generalized abd pain and spasms - ranks at 6 on pain scale.  In past hour vomiting and diarrhea about every 15 min - \"just water\".  Denies fever.  Tearful.  Has tried Phenergan and Zofran which has on hand, but cannot keep down.    Advise pt will send message to Dr Pacheco, but if symptoms persist - go to ER.  Pt verb understanding.  "

## 2021-04-16 ENCOUNTER — BULK ORDERING (OUTPATIENT)
Dept: CASE MANAGEMENT | Facility: OTHER | Age: 26
End: 2021-04-16

## 2021-04-16 DIAGNOSIS — Z23 IMMUNIZATION DUE: ICD-10-CM
